# Patient Record
Sex: MALE | Race: WHITE | NOT HISPANIC OR LATINO | Employment: OTHER | URBAN - METROPOLITAN AREA
[De-identification: names, ages, dates, MRNs, and addresses within clinical notes are randomized per-mention and may not be internally consistent; named-entity substitution may affect disease eponyms.]

---

## 2019-12-05 ENCOUNTER — APPOINTMENT (OUTPATIENT)
Dept: GENERAL RADIOLOGY | Facility: HOSPITAL | Age: 64
End: 2019-12-05

## 2019-12-05 ENCOUNTER — HOSPITAL ENCOUNTER (OUTPATIENT)
Facility: HOSPITAL | Age: 64
Discharge: HOME | End: 2019-12-06
Attending: EMERGENCY MEDICINE | Admitting: INTERNAL MEDICINE

## 2019-12-05 DIAGNOSIS — T18.128A FOOD IMPACTION OF ESOPHAGUS, INITIAL ENCOUNTER: ICD-10-CM

## 2019-12-05 DIAGNOSIS — R13.10 DYSPHAGIA, UNSPECIFIED TYPE: Primary | ICD-10-CM

## 2019-12-05 LAB
ANION GAP SERPL CALCULATED.3IONS-SCNC: 16 MMOL/L (ref 5–15)
BASOPHILS # BLD AUTO: 0.05 10*3/MM3 (ref 0–0.2)
BASOPHILS NFR BLD AUTO: 0.6 % (ref 0–1.5)
BUN BLD-MCNC: 17 MG/DL (ref 8–23)
BUN/CREAT SERPL: 15.6 (ref 7–25)
CALCIUM SPEC-SCNC: 9.6 MG/DL (ref 8.6–10.5)
CHLORIDE SERPL-SCNC: 102 MMOL/L (ref 98–107)
CO2 SERPL-SCNC: 24 MMOL/L (ref 22–29)
CREAT BLD-MCNC: 1.09 MG/DL (ref 0.76–1.27)
DEPRECATED RDW RBC AUTO: 45.9 FL (ref 37–54)
EOSINOPHIL # BLD AUTO: 0.29 10*3/MM3 (ref 0–0.4)
EOSINOPHIL NFR BLD AUTO: 3.4 % (ref 0.3–6.2)
ERYTHROCYTE [DISTWIDTH] IN BLOOD BY AUTOMATED COUNT: 13 % (ref 12.3–15.4)
GFR SERPL CREATININE-BSD FRML MDRD: 68 ML/MIN/1.73
GLUCOSE BLD-MCNC: 192 MG/DL (ref 65–99)
HCT VFR BLD AUTO: 42.4 % (ref 37.5–51)
HGB BLD-MCNC: 13.7 G/DL (ref 13–17.7)
HOLD SPECIMEN: NORMAL
HOLD SPECIMEN: NORMAL
IMM GRANULOCYTES # BLD AUTO: 0.03 10*3/MM3 (ref 0–0.05)
IMM GRANULOCYTES NFR BLD AUTO: 0.3 % (ref 0–0.5)
LYMPHOCYTES # BLD AUTO: 3.27 10*3/MM3 (ref 0.7–3.1)
LYMPHOCYTES NFR BLD AUTO: 38.1 % (ref 19.6–45.3)
MCH RBC QN AUTO: 31 PG (ref 26.6–33)
MCHC RBC AUTO-ENTMCNC: 32.3 G/DL (ref 31.5–35.7)
MCV RBC AUTO: 95.9 FL (ref 79–97)
MONOCYTES # BLD AUTO: 0.65 10*3/MM3 (ref 0.1–0.9)
MONOCYTES NFR BLD AUTO: 7.6 % (ref 5–12)
NEUTROPHILS # BLD AUTO: 4.3 10*3/MM3 (ref 1.7–7)
NEUTROPHILS NFR BLD AUTO: 50 % (ref 42.7–76)
NRBC BLD AUTO-RTO: 0 /100 WBC (ref 0–0.2)
PLATELET # BLD AUTO: 229 10*3/MM3 (ref 140–450)
PMV BLD AUTO: 11.1 FL (ref 6–12)
POTASSIUM BLD-SCNC: 4 MMOL/L (ref 3.5–5.2)
RBC # BLD AUTO: 4.42 10*6/MM3 (ref 4.14–5.8)
SODIUM BLD-SCNC: 142 MMOL/L (ref 136–145)
WBC NRBC COR # BLD: 8.59 10*3/MM3 (ref 3.4–10.8)
WHOLE BLOOD HOLD SPECIMEN: NORMAL
WHOLE BLOOD HOLD SPECIMEN: NORMAL

## 2019-12-05 PROCEDURE — 85025 COMPLETE CBC W/AUTO DIFF WBC: CPT | Performed by: EMERGENCY MEDICINE

## 2019-12-05 PROCEDURE — 83690 ASSAY OF LIPASE: CPT | Performed by: FAMILY MEDICINE

## 2019-12-05 PROCEDURE — 80048 BASIC METABOLIC PNL TOTAL CA: CPT | Performed by: EMERGENCY MEDICINE

## 2019-12-05 PROCEDURE — 86900 BLOOD TYPING SEROLOGIC ABO: CPT

## 2019-12-05 PROCEDURE — 71045 X-RAY EXAM CHEST 1 VIEW: CPT

## 2019-12-05 PROCEDURE — G0378 HOSPITAL OBSERVATION PER HR: HCPCS

## 2019-12-05 PROCEDURE — 86901 BLOOD TYPING SEROLOGIC RH(D): CPT

## 2019-12-05 PROCEDURE — 96375 TX/PRO/DX INJ NEW DRUG ADDON: CPT

## 2019-12-05 PROCEDURE — 99284 EMERGENCY DEPT VISIT MOD MDM: CPT

## 2019-12-05 PROCEDURE — 25010000002 GLUCAGON (HUMAN RECOMBINANT) 1 MG RECONSTITUTED SOLUTION: Performed by: EMERGENCY MEDICINE

## 2019-12-05 PROCEDURE — 25010000002 ONDANSETRON PER 1 MG: Performed by: EMERGENCY MEDICINE

## 2019-12-05 PROCEDURE — 96374 THER/PROPH/DIAG INJ IV PUSH: CPT

## 2019-12-05 RX ORDER — ONDANSETRON 2 MG/ML
4 INJECTION INTRAMUSCULAR; INTRAVENOUS ONCE
Status: COMPLETED | OUTPATIENT
Start: 2019-12-05 | End: 2019-12-05

## 2019-12-05 RX ORDER — WATER 1000 ML/1000ML
INJECTION, SOLUTION INTRAVENOUS
Status: DISPENSED
Start: 2019-12-05 | End: 2019-12-06

## 2019-12-05 RX ADMIN — ONDANSETRON 4 MG: 2 INJECTION INTRAMUSCULAR; INTRAVENOUS at 23:10

## 2019-12-05 RX ADMIN — SODIUM CHLORIDE 500 ML: 9 INJECTION, SOLUTION INTRAVENOUS at 23:50

## 2019-12-05 RX ADMIN — GLUCAGON HYDROCHLORIDE 1 MG: 1 INJECTION, POWDER, FOR SOLUTION INTRAMUSCULAR; INTRAVENOUS; SUBCUTANEOUS at 23:12

## 2019-12-06 ENCOUNTER — ANESTHESIA EVENT (OUTPATIENT)
Dept: GASTROENTEROLOGY | Facility: HOSPITAL | Age: 64
End: 2019-12-06

## 2019-12-06 ENCOUNTER — APPOINTMENT (OUTPATIENT)
Dept: CT IMAGING | Facility: HOSPITAL | Age: 64
End: 2019-12-06

## 2019-12-06 ENCOUNTER — ANESTHESIA (OUTPATIENT)
Dept: GASTROENTEROLOGY | Facility: HOSPITAL | Age: 64
End: 2019-12-06

## 2019-12-06 VITALS
TEMPERATURE: 98.8 F | SYSTOLIC BLOOD PRESSURE: 127 MMHG | WEIGHT: 185 LBS | RESPIRATION RATE: 16 BRPM | BODY MASS INDEX: 25.9 KG/M2 | HEIGHT: 71 IN | HEART RATE: 88 BPM | DIASTOLIC BLOOD PRESSURE: 82 MMHG | OXYGEN SATURATION: 93 %

## 2019-12-06 PROBLEM — G20 PARKINSON DISEASE (HCC): Status: ACTIVE | Noted: 2019-12-06

## 2019-12-06 PROBLEM — H54.8 LEGALLY BLIND: Status: ACTIVE | Noted: 2019-12-06

## 2019-12-06 PROBLEM — E11.9 T2DM (TYPE 2 DIABETES MELLITUS) (HCC): Status: ACTIVE | Noted: 2019-12-06

## 2019-12-06 PROBLEM — R13.10 DYSPHAGIA: Status: ACTIVE | Noted: 2019-12-06

## 2019-12-06 PROBLEM — R55 VASO VAGAL EPISODE: Status: ACTIVE | Noted: 2019-12-06

## 2019-12-06 PROBLEM — K21.9 GERD (GASTROESOPHAGEAL REFLUX DISEASE): Status: ACTIVE | Noted: 2019-12-06

## 2019-12-06 PROBLEM — K22.70 BARRETT'S ESOPHAGUS: Status: ACTIVE | Noted: 2019-12-06

## 2019-12-06 PROBLEM — J45.909 ASTHMA: Status: ACTIVE | Noted: 2019-12-06

## 2019-12-06 PROBLEM — K20.90 ESOPHAGITIS: Status: ACTIVE | Noted: 2019-12-05

## 2019-12-06 PROBLEM — I10 HTN (HYPERTENSION): Status: ACTIVE | Noted: 2019-12-06

## 2019-12-06 PROBLEM — H35.52 RETINITIS PIGMENTOSA: Status: ACTIVE | Noted: 2019-12-06

## 2019-12-06 LAB
ABO GROUP BLD: NORMAL
ABO GROUP BLD: NORMAL
ALBUMIN SERPL-MCNC: 4.3 G/DL (ref 3.5–5.2)
ALBUMIN/GLOB SERPL: 1.6 G/DL
ALP SERPL-CCNC: 42 U/L (ref 39–117)
ALT SERPL W P-5'-P-CCNC: 20 U/L (ref 1–41)
ANION GAP SERPL CALCULATED.3IONS-SCNC: 14 MMOL/L (ref 5–15)
APTT PPP: 30.3 SECONDS (ref 24–37)
AST SERPL-CCNC: 13 U/L (ref 1–40)
BILIRUB SERPL-MCNC: 0.3 MG/DL (ref 0.2–1.2)
BLD GP AB SCN SERPL QL: NEGATIVE
BUN BLD-MCNC: 16 MG/DL (ref 8–23)
BUN/CREAT SERPL: 15.4 (ref 7–25)
CALCIUM SPEC-SCNC: 9.1 MG/DL (ref 8.6–10.5)
CHLORIDE SERPL-SCNC: 101 MMOL/L (ref 98–107)
CO2 SERPL-SCNC: 25 MMOL/L (ref 22–29)
CREAT BLD-MCNC: 1.04 MG/DL (ref 0.76–1.27)
DIGOXIN SERPL-MCNC: 0.68 NG/ML (ref 0.6–1.2)
GFR SERPL CREATININE-BSD FRML MDRD: 72 ML/MIN/1.73
GLOBULIN UR ELPH-MCNC: 2.7 GM/DL
GLUCOSE BLD-MCNC: 149 MG/DL (ref 65–99)
GLUCOSE BLDC GLUCOMTR-MCNC: 143 MG/DL (ref 70–130)
GLUCOSE BLDC GLUCOMTR-MCNC: 156 MG/DL (ref 70–130)
INR PPP: 1.02 (ref 0.85–1.16)
LIPASE SERPL-CCNC: 41 U/L (ref 13–60)
POTASSIUM BLD-SCNC: 4.5 MMOL/L (ref 3.5–5.2)
PROT SERPL-MCNC: 7 G/DL (ref 6–8.5)
PROTHROMBIN TIME: 12.9 SECONDS (ref 11.2–14.3)
RH BLD: NEGATIVE
RH BLD: NEGATIVE
SODIUM BLD-SCNC: 140 MMOL/L (ref 136–145)
T&S EXPIRATION DATE: NORMAL

## 2019-12-06 PROCEDURE — 63710000001 INSULIN LISPRO (HUMAN) PER 5 UNITS: Performed by: NURSE PRACTITIONER

## 2019-12-06 PROCEDURE — 93010 ELECTROCARDIOGRAM REPORT: CPT | Performed by: INTERNAL MEDICINE

## 2019-12-06 PROCEDURE — 74176 CT ABD & PELVIS W/O CONTRAST: CPT

## 2019-12-06 PROCEDURE — G0378 HOSPITAL OBSERVATION PER HR: HCPCS

## 2019-12-06 PROCEDURE — 85610 PROTHROMBIN TIME: CPT | Performed by: NURSE PRACTITIONER

## 2019-12-06 PROCEDURE — 99236 HOSP IP/OBS SAME DATE HI 85: CPT | Performed by: FAMILY MEDICINE

## 2019-12-06 PROCEDURE — 80162 ASSAY OF DIGOXIN TOTAL: CPT | Performed by: INTERNAL MEDICINE

## 2019-12-06 PROCEDURE — 82962 GLUCOSE BLOOD TEST: CPT

## 2019-12-06 PROCEDURE — 88305 TISSUE EXAM BY PATHOLOGIST: CPT | Performed by: INTERNAL MEDICINE

## 2019-12-06 PROCEDURE — 25010000002 ACYCLOVIR PER 5 MG: Performed by: INTERNAL MEDICINE

## 2019-12-06 PROCEDURE — 25010000002 SUCCINYLCHOLINE PER 20 MG: Performed by: NURSE ANESTHETIST, CERTIFIED REGISTERED

## 2019-12-06 PROCEDURE — 86850 RBC ANTIBODY SCREEN: CPT | Performed by: NURSE PRACTITIONER

## 2019-12-06 PROCEDURE — 99204 OFFICE O/P NEW MOD 45 MIN: CPT | Performed by: INTERNAL MEDICINE

## 2019-12-06 PROCEDURE — 93005 ELECTROCARDIOGRAM TRACING: CPT | Performed by: FAMILY MEDICINE

## 2019-12-06 PROCEDURE — 80053 COMPREHEN METABOLIC PANEL: CPT | Performed by: FAMILY MEDICINE

## 2019-12-06 PROCEDURE — 25010000002 PROPOFOL 10 MG/ML EMULSION: Performed by: NURSE ANESTHETIST, CERTIFIED REGISTERED

## 2019-12-06 PROCEDURE — 85730 THROMBOPLASTIN TIME PARTIAL: CPT | Performed by: NURSE PRACTITIONER

## 2019-12-06 PROCEDURE — 71250 CT THORAX DX C-: CPT

## 2019-12-06 PROCEDURE — 96361 HYDRATE IV INFUSION ADD-ON: CPT

## 2019-12-06 PROCEDURE — 86901 BLOOD TYPING SEROLOGIC RH(D): CPT | Performed by: NURSE PRACTITIONER

## 2019-12-06 PROCEDURE — 25010000002 MORPHINE PER 10 MG: Performed by: FAMILY MEDICINE

## 2019-12-06 PROCEDURE — 96376 TX/PRO/DX INJ SAME DRUG ADON: CPT

## 2019-12-06 PROCEDURE — 86900 BLOOD TYPING SEROLOGIC ABO: CPT | Performed by: NURSE PRACTITIONER

## 2019-12-06 PROCEDURE — 96375 TX/PRO/DX INJ NEW DRUG ADDON: CPT

## 2019-12-06 RX ORDER — AMOXICILLIN 500 MG
CAPSULE ORAL 2 TIMES DAILY WITH MEALS
COMMUNITY

## 2019-12-06 RX ORDER — ALBUTEROL SULFATE 90 UG/1
2 AEROSOL, METERED RESPIRATORY (INHALATION) EVERY 4 HOURS PRN
COMMUNITY

## 2019-12-06 RX ORDER — ROCURONIUM BROMIDE 10 MG/ML
INJECTION, SOLUTION INTRAVENOUS AS NEEDED
Status: DISCONTINUED | OUTPATIENT
Start: 2019-12-06 | End: 2019-12-06 | Stop reason: SURG

## 2019-12-06 RX ORDER — CLONAZEPAM 1 MG/1
1 TABLET ORAL 2 TIMES DAILY PRN
COMMUNITY

## 2019-12-06 RX ORDER — ONDANSETRON 2 MG/ML
4 INJECTION INTRAMUSCULAR; INTRAVENOUS EVERY 6 HOURS PRN
Status: DISCONTINUED | OUTPATIENT
Start: 2019-12-06 | End: 2019-12-06 | Stop reason: SDUPTHER

## 2019-12-06 RX ORDER — PANTOPRAZOLE SODIUM 40 MG/10ML
40 INJECTION, POWDER, LYOPHILIZED, FOR SOLUTION INTRAVENOUS EVERY 12 HOURS SCHEDULED
Status: DISCONTINUED | OUTPATIENT
Start: 2019-12-06 | End: 2019-12-06 | Stop reason: HOSPADM

## 2019-12-06 RX ORDER — GABAPENTIN 800 MG/1
800 TABLET ORAL 2 TIMES DAILY
COMMUNITY

## 2019-12-06 RX ORDER — PROMETHAZINE HYDROCHLORIDE 25 MG/ML
6.25 INJECTION, SOLUTION INTRAMUSCULAR; INTRAVENOUS ONCE AS NEEDED
Status: DISCONTINUED | OUTPATIENT
Start: 2019-12-06 | End: 2019-12-06 | Stop reason: HOSPADM

## 2019-12-06 RX ORDER — LORAZEPAM 2 MG/ML
0.5 INJECTION INTRAMUSCULAR ONCE
Status: DISCONTINUED | OUTPATIENT
Start: 2019-12-06 | End: 2019-12-06

## 2019-12-06 RX ORDER — ACYCLOVIR 400 MG/1
400 TABLET ORAL 3 TIMES DAILY
Qty: 30 TABLET | Refills: 0 | Status: SHIPPED | OUTPATIENT
Start: 2019-12-06 | End: 2019-12-16

## 2019-12-06 RX ORDER — ALBUTEROL SULFATE 2.5 MG/3ML
2.5 SOLUTION RESPIRATORY (INHALATION) EVERY 6 HOURS PRN
Status: DISCONTINUED | OUTPATIENT
Start: 2019-12-06 | End: 2019-12-06 | Stop reason: HOSPADM

## 2019-12-06 RX ORDER — PANTOPRAZOLE SODIUM 40 MG/1
40 TABLET, DELAYED RELEASE ORAL DAILY
Qty: 30 TABLET | Refills: 0 | Status: SHIPPED | OUTPATIENT
Start: 2019-12-06

## 2019-12-06 RX ORDER — FAMOTIDINE 10 MG/ML
20 INJECTION, SOLUTION INTRAVENOUS EVERY 12 HOURS SCHEDULED
Status: DISCONTINUED | OUTPATIENT
Start: 2019-12-06 | End: 2019-12-06 | Stop reason: HOSPADM

## 2019-12-06 RX ORDER — DEXTROSE MONOHYDRATE 25 G/50ML
25 INJECTION, SOLUTION INTRAVENOUS
Status: DISCONTINUED | OUTPATIENT
Start: 2019-12-06 | End: 2019-12-06 | Stop reason: HOSPADM

## 2019-12-06 RX ORDER — DIGOXIN 125 MCG
125 TABLET ORAL
COMMUNITY
End: 2019-12-06 | Stop reason: HOSPADM

## 2019-12-06 RX ORDER — SUCCINYLCHOLINE CHLORIDE 20 MG/ML
INJECTION INTRAMUSCULAR; INTRAVENOUS AS NEEDED
Status: DISCONTINUED | OUTPATIENT
Start: 2019-12-06 | End: 2019-12-06 | Stop reason: SURG

## 2019-12-06 RX ORDER — SODIUM CHLORIDE 0.9 % (FLUSH) 0.9 %
10 SYRINGE (ML) INJECTION EVERY 12 HOURS SCHEDULED
Status: DISCONTINUED | OUTPATIENT
Start: 2019-12-06 | End: 2019-12-06 | Stop reason: HOSPADM

## 2019-12-06 RX ORDER — ASPIRIN 325 MG
325 TABLET ORAL 2 TIMES DAILY
COMMUNITY

## 2019-12-06 RX ORDER — ONDANSETRON 2 MG/ML
4 INJECTION INTRAMUSCULAR; INTRAVENOUS ONCE AS NEEDED
Status: DISCONTINUED | OUTPATIENT
Start: 2019-12-06 | End: 2019-12-06 | Stop reason: HOSPADM

## 2019-12-06 RX ORDER — ONDANSETRON 4 MG/1
4 TABLET, FILM COATED ORAL EVERY 6 HOURS PRN
Status: DISCONTINUED | OUTPATIENT
Start: 2019-12-06 | End: 2019-12-06 | Stop reason: HOSPADM

## 2019-12-06 RX ORDER — SODIUM CHLORIDE 0.9 % (FLUSH) 0.9 %
10 SYRINGE (ML) INJECTION AS NEEDED
Status: DISCONTINUED | OUTPATIENT
Start: 2019-12-06 | End: 2019-12-06 | Stop reason: HOSPADM

## 2019-12-06 RX ORDER — MORPHINE SULFATE 2 MG/ML
2 INJECTION, SOLUTION INTRAMUSCULAR; INTRAVENOUS
Status: DISCONTINUED | OUTPATIENT
Start: 2019-12-06 | End: 2019-12-06 | Stop reason: HOSPADM

## 2019-12-06 RX ORDER — PROMETHAZINE HYDROCHLORIDE 25 MG/1
25 SUPPOSITORY RECTAL ONCE AS NEEDED
Status: DISCONTINUED | OUTPATIENT
Start: 2019-12-06 | End: 2019-12-06 | Stop reason: HOSPADM

## 2019-12-06 RX ORDER — PROPOFOL 10 MG/ML
VIAL (ML) INTRAVENOUS AS NEEDED
Status: DISCONTINUED | OUTPATIENT
Start: 2019-12-06 | End: 2019-12-06 | Stop reason: SURG

## 2019-12-06 RX ORDER — SODIUM CHLORIDE, SODIUM LACTATE, POTASSIUM CHLORIDE, CALCIUM CHLORIDE 600; 310; 30; 20 MG/100ML; MG/100ML; MG/100ML; MG/100ML
20 INJECTION, SOLUTION INTRAVENOUS ONCE
Status: COMPLETED | OUTPATIENT
Start: 2019-12-06 | End: 2019-12-06

## 2019-12-06 RX ORDER — NORTRIPTYLINE HYDROCHLORIDE 25 MG/1
25 CAPSULE ORAL NIGHTLY
COMMUNITY

## 2019-12-06 RX ORDER — HYDROCODONE BITARTRATE AND ACETAMINOPHEN 5; 325 MG/1; MG/1
1 TABLET ORAL EVERY 6 HOURS PRN
COMMUNITY

## 2019-12-06 RX ORDER — IPRATROPIUM BROMIDE AND ALBUTEROL SULFATE 2.5; .5 MG/3ML; MG/3ML
3 SOLUTION RESPIRATORY (INHALATION) ONCE AS NEEDED
Status: DISCONTINUED | OUTPATIENT
Start: 2019-12-06 | End: 2019-12-06 | Stop reason: HOSPADM

## 2019-12-06 RX ORDER — MORPHINE SULFATE 2 MG/ML
2 INJECTION, SOLUTION INTRAMUSCULAR; INTRAVENOUS ONCE
Status: COMPLETED | OUTPATIENT
Start: 2019-12-06 | End: 2019-12-06

## 2019-12-06 RX ORDER — PROMETHAZINE HYDROCHLORIDE 25 MG/1
25 TABLET ORAL ONCE AS NEEDED
Status: DISCONTINUED | OUTPATIENT
Start: 2019-12-06 | End: 2019-12-06 | Stop reason: HOSPADM

## 2019-12-06 RX ORDER — LORAZEPAM 2 MG/ML
0.25 INJECTION INTRAMUSCULAR ONCE
Status: DISCONTINUED | OUTPATIENT
Start: 2019-12-06 | End: 2019-12-06

## 2019-12-06 RX ORDER — SODIUM CHLORIDE 9 MG/ML
75 INJECTION, SOLUTION INTRAVENOUS ONCE
Status: DISCONTINUED | OUTPATIENT
Start: 2019-12-06 | End: 2019-12-06 | Stop reason: HOSPADM

## 2019-12-06 RX ORDER — ONDANSETRON 2 MG/ML
4 INJECTION INTRAMUSCULAR; INTRAVENOUS EVERY 6 HOURS PRN
Status: DISCONTINUED | OUTPATIENT
Start: 2019-12-06 | End: 2019-12-06 | Stop reason: HOSPADM

## 2019-12-06 RX ORDER — LABETALOL HYDROCHLORIDE 5 MG/ML
5 INJECTION, SOLUTION INTRAVENOUS
Status: DISCONTINUED | OUTPATIENT
Start: 2019-12-06 | End: 2019-12-06 | Stop reason: HOSPADM

## 2019-12-06 RX ORDER — SODIUM CHLORIDE, SODIUM LACTATE, POTASSIUM CHLORIDE, CALCIUM CHLORIDE 600; 310; 30; 20 MG/100ML; MG/100ML; MG/100ML; MG/100ML
100 INJECTION, SOLUTION INTRAVENOUS CONTINUOUS
Status: DISCONTINUED | OUTPATIENT
Start: 2019-12-06 | End: 2019-12-06 | Stop reason: HOSPADM

## 2019-12-06 RX ORDER — ROSUVASTATIN CALCIUM 5 MG/1
5 TABLET, COATED ORAL DAILY
COMMUNITY

## 2019-12-06 RX ORDER — HYDRALAZINE HYDROCHLORIDE 20 MG/ML
5 INJECTION INTRAMUSCULAR; INTRAVENOUS
Status: DISCONTINUED | OUTPATIENT
Start: 2019-12-06 | End: 2019-12-06 | Stop reason: HOSPADM

## 2019-12-06 RX ORDER — NICOTINE POLACRILEX 4 MG
15 LOZENGE BUCCAL
Status: DISCONTINUED | OUTPATIENT
Start: 2019-12-06 | End: 2019-12-06 | Stop reason: HOSPADM

## 2019-12-06 RX ADMIN — FAMOTIDINE 20 MG: 10 INJECTION, SOLUTION INTRAVENOUS at 08:34

## 2019-12-06 RX ADMIN — MORPHINE SULFATE 2 MG: 2 INJECTION, SOLUTION INTRAMUSCULAR; INTRAVENOUS at 05:00

## 2019-12-06 RX ADMIN — PANTOPRAZOLE SODIUM 40 MG: 40 INJECTION, POWDER, FOR SOLUTION INTRAVENOUS at 03:11

## 2019-12-06 RX ADMIN — SODIUM CHLORIDE, POTASSIUM CHLORIDE, SODIUM LACTATE AND CALCIUM CHLORIDE 100 ML/HR: 600; 310; 30; 20 INJECTION, SOLUTION INTRAVENOUS at 03:12

## 2019-12-06 RX ADMIN — SODIUM CHLORIDE, PRESERVATIVE FREE 10 ML: 5 INJECTION INTRAVENOUS at 08:34

## 2019-12-06 RX ADMIN — ACYCLOVIR SODIUM 750 MG: 50 INJECTION, SOLUTION INTRAVENOUS at 15:05

## 2019-12-06 RX ADMIN — FAMOTIDINE 20 MG: 10 INJECTION, SOLUTION INTRAVENOUS at 03:11

## 2019-12-06 RX ADMIN — SUCCINYLCHOLINE CHLORIDE 180 MG: 20 INJECTION, SOLUTION INTRAMUSCULAR; INTRAVENOUS; PARENTERAL at 11:23

## 2019-12-06 RX ADMIN — SODIUM CHLORIDE, POTASSIUM CHLORIDE, SODIUM LACTATE AND CALCIUM CHLORIDE 20 ML/HR: 600; 310; 30; 20 INJECTION, SOLUTION INTRAVENOUS at 11:04

## 2019-12-06 RX ADMIN — PROPOFOL 100 MG: 10 INJECTION, EMULSION INTRAVENOUS at 11:23

## 2019-12-06 RX ADMIN — ROCURONIUM BROMIDE 5 MG: 10 INJECTION INTRAVENOUS at 11:23

## 2019-12-06 RX ADMIN — PANTOPRAZOLE SODIUM 40 MG: 40 INJECTION, POWDER, FOR SOLUTION INTRAVENOUS at 08:34

## 2019-12-06 NOTE — ANESTHESIA PROCEDURE NOTES
Airway  Urgency: elective    Date/Time: 12/6/2019 11:24 AM  Airway not difficult    General Information and Staff    Patient location during procedure: OR  CRNA: Clifton Mast CRNA    Indications and Patient Condition  Indications for airway management: airway protection    Preoxygenated: yes  MILS not maintained throughout  Mask difficulty assessment: 1 - vent by mask    Final Airway Details  Final airway type: endotracheal airway      Successful airway: ETT  Cuffed: yes   Successful intubation technique: direct laryngoscopy and RSI  Facilitating devices/methods: cricoid pressure  Endotracheal tube insertion site: oral  Blade: Roberot  Blade size: 3  ETT size (mm): 7.5  Cormack-Lehane Classification: grade I - full view of glottis  Placement verified by: chest auscultation and capnometry   Measured from: lips  ETT/EBT  to lips (cm): 20  Number of attempts at approach: 1    Additional Comments  Negative epigastric sounds, Breath sound equal bilaterally with symmetric chest rise and fall

## 2019-12-06 NOTE — ANESTHESIA POSTPROCEDURE EVALUATION
Patient: Hernan Caicedo    Procedure Summary     Date:  12/06/19 Room / Location:   [facility]    Anesthesia Start:  1113 Anesthesia Stop:  1145    Procedure:  ESOPHAGOGASTRODUODENOSCOPYwith biopsy (N/A ) Diagnosis:       Food impaction of esophagus, initial encounter      (Food impaction of esophagus, initial encounter [T18.128A])    Surgeon:  Aldo Mejia MD Provider:  Jose Francisco Cohen MD    Anesthesia Type:  general ASA Status:  3          Anesthesia Type: general  Last vitals  BP   127/82 (12/06/19 1220)   Temp   98.8 °F (37.1 °C) (12/06/19 1220)   Pulse   88 (12/06/19 1220)   Resp   16 (12/06/19 1220)     SpO2   93 % (12/06/19 1220)     Post Anesthesia Care and Evaluation    Patient location during evaluation: PACU  Patient participation: complete - patient participated  Level of consciousness: awake and alert  Pain score: 0  Pain management: adequate  Airway patency: patent  Anesthetic complications: No anesthetic complications  PONV Status: none  Cardiovascular status: hemodynamically stable and acceptable  Respiratory status: nonlabored ventilation, acceptable and nasal cannula  Hydration status: acceptable

## 2019-12-06 NOTE — CONSULTS
[facility]    Referring Provider: No ref. provider found    Primary Care Provider: Provider, No Known    Reason for Consultation: Esophageal food impaction    Chief complaint cannot swallow    History of present illness:  Hernan Caicedo is a 64 y.o. male who is admitted with esophageal food impaction.  This began about 7:00 last night.  States he has trouble with his secretions well.  Does complain of dyspepsia.  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  He is visiting here from [place] as his wife is at  undergoing evaluation for liver transplant.  He has had increased dysphasia dysphagia over just the last week.  No weight loss.  He takes antacids but no PPIs.  No prior meat impaction.  Overnight has had significant bradycardia.  He has had a lot of hiccups overnight.  Allergies:  Haldol [haloperidol]    Scheduled Meds:    Pharmacy Consult  Does not apply Q12H   famotidine 20 mg Intravenous Q12H   insulin lispro 0-7 Units Subcutaneous 4x Daily With Meals & Nightly   pantoprazole 40 mg Intravenous Q12H   sodium chloride 10 mL Intravenous Q12H   sodium chloride 75 mL/hr Intravenous Once   sterile water (preservative free)           Infusions:    lactated ringers 100 mL/hr Last Rate: 100 mL/hr (12/06/19 0312)       PRN Meds:  albuterol  •  dextrose  •  dextrose  •  glucagon (human recombinant)  •  Morphine  •  ondansetron **OR** ondansetron  •  ondansetron  •  sodium chloride    Home Meds:  Medications Prior to Admission   Medication Sig Dispense Refill Last Dose   • albuterol sulfate  (90 Base) MCG/ACT inhaler Inhale 2 puffs Every 4 (Four) Hours As Needed for Wheezing.   Past Week at Unknown time   • aspirin 325 MG tablet Take 325 mg by mouth 2 (Two) Times a Day.   12/6/2019 at Unknown time   • carbidopa-levodopa (SINEMET)  MG per tablet Take 2 tablets by mouth 4 (Four) Times a Day.   12/6/2019 at 1200   • clonazePAM (KlonoPIN) 1 MG tablet Take 1 mg by mouth 2 (Two)  Times a Day As Needed for Seizures.   12/5/2019 at Unknown time   • digoxin (LANOXIN) 125 MCG tablet Take 125 mcg by mouth Daily.   12/6/2019 at Unknown time   • gabapentin (NEURONTIN) 800 MG tablet Take 800 mg by mouth 2 (Two) Times a Day.   12/6/2019 at 0900   • HYDROcodone-acetaminophen (NORCO) 5-325 MG per tablet Take 1 tablet by mouth Every 6 (Six) Hours As Needed.   Past Week at Unknown time   • metFORMIN (GLUCOPHAGE) 500 MG tablet Take 500 mg by mouth 2 (Two) Times a Day With Meals.   12/6/2019 at Unknown time   • mometasone (ASMANEX TWISTHALER) inhaler 110 mcg/inhalation Inhale 2 puffs Daily.   12/6/2019 at Unknown time   • nortriptyline (PAMELOR) 25 MG capsule Take 25 mg by mouth Every Night.   12/5/2019 at Unknown time   • Omega-3 Fatty Acids (FISH OIL) 1200 MG capsule capsule Take  by mouth 2 (Two) Times a Day With Meals.   12/6/2019 at Unknown time   • rosuvastatin (CRESTOR) 5 MG tablet Take 5 mg by mouth Daily.   12/6/2019 at Unknown time   • vitamin A 34882 UNIT capsule Take 16,000 Units by mouth Daily.   12/6/2019 at Unknown time       ROS: Review of Systems   Constitutional: Negative for appetite change and unexpected weight change.   HENT: Positive for trouble swallowing.    Eyes: Positive for visual disturbance.   Respiratory: Negative for shortness of breath.    Cardiovascular: Negative for chest pain.   Gastrointestinal: Negative for abdominal pain.   Skin: Negative for color change.   All other systems reviewed and are negative.      PAST MED HX: Pt  has a past medical history of Asthma, Diabetes mellitus (CMS/HCC), GERD (gastroesophageal reflux disease), vasectomy, Hypertension, Legally blind, Orthostatic hypotension, Parkinson disease (CMS/HCC), Parkinson disease (CMS/HCC), Parkinson disease (CMS/HCC), PONV (postoperative nausea and vomiting), and Retinitis pigmentosa.  PAST SURG HX: Pt  has a past surgical history that includes Appendectomy; Cataract extraction, bilateral (2000);  "Tonsillectomy; Cholecystectomy; and Esophagogastroduodenoscopy.  FAM HX: family history includes Cancer in his mother; Neuropathy in his father.  SOC HX: Pt  reports that he has never smoked. He has never used smokeless tobacco. He reports that he does not drink alcohol or use drugs.    /94 (BP Location: Right arm, Patient Position: Lying)   Pulse 76   Temp 97.9 °F (36.6 °C) (Oral)   Resp 16   Ht 180.3 cm (71\")   Wt 83.9 kg (185 lb)   SpO2 94%   BMI 25.80 kg/m²     Physical Exam  Wt Readings from Last 3 Encounters:   12/05/19 83.9 kg (185 lb)   ,body mass index is 25.8 kg/m².    General Well developed; well nourished; no acute distress.  He is not drooling.  He does have hiccups  ENT Good dentition.  Oral mucosa pink & moist without thrush or lesions.    Neck Neck supple; trachea midline. No thyromegaly  Resp CTA; no rhonchi, rales, or wheezes.  Respiration effort normal  CV RRR; ; no M/R/G. No lower extremity edema  GI Abd soft, NT, ND, normal active bowel sounds.  No HSM.  No abd hernia  Skin No rash; no lesions; no bruises.  Skin turgor normal  Musc No clubbing; no cyanosis.    Psych Oriented to time, place, and person.  Appropriate affect      Results Review:   I reviewed the patient's new clinical results.  I reviewed the patient's new imaging results and agree with the interpretation.    Lab Results   Component Value Date    WBC 8.59 12/05/2019    HGB 13.7 12/05/2019    HCT 42.4 12/05/2019    MCV 95.9 12/05/2019     12/05/2019       Lab Results   Component Value Date    GLUCOSE 149 (H) 12/06/2019    BUN 16 12/06/2019    CREATININE 1.04 12/06/2019    EGFRIFNONA 72 12/06/2019    BCR 15.4 12/06/2019    CO2 25.0 12/06/2019    CALCIUM 9.1 12/06/2019    ALBUMIN 4.30 12/06/2019    AST 13 12/06/2019    ALT 20 12/06/2019       ASSESSMENTS/PLANS    1.  Esophageal food impaction  2.  Bradycardia  3.  Legally blind secondary to retinitis pigmentosa  4.  Parkinson's disease    Currently patient seems to " be tolerating secretions well.  He is having a lot of hiccups.  This started last night as well.  Does not appear to be in distress.  He has had bradycardia during the evening with heart rates down into the 30s.  No prior cardiac history.    Continue PPI.  Plan EGD this a.m. if cleared by cardiology.    I discussed the patients findings and my recommendations with patient and primary care team    Aldo Mejia MD  12/06/19  8:38 AM
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
## 2019-12-06 NOTE — DISCHARGE SUMMARY
[facility]  DISCHARGE SUMMARY    Patient Name: Hernan Caicedo  : 1955  MRN: [id number]    Date of Admission: 2019  9:31 PM  Date of Discharge:  2019  Primary Care Physician: Provider, No Known    Consults     Date and Time Order Name Status Description    2019 0239 Inpatient Cardiology Consult Completed     2019 0208 Inpatient Gastroenterology Consult Completed           Hospital Course     Presenting Problem:   Food impaction of esophagus [T18.128A]  Dysphagia, unspecified type [R13.10]    Active Hospital Problems    Diagnosis  POA   • **Esophagitis [K20.9]  Yes   • T2DM (type 2 diabetes mellitus) (CMS/[facility]) [E11.9]  Yes   • GERD (gastroesophageal reflux disease) [K21.9]  Yes   • HTN (hypertension) [I10]  Yes   • Parkinson disease (CMS/[facility]) [G20]  Yes   • Retinitis pigmentosa [H35.52]  Yes   • Legally blind [H54.8]  Yes   • Asthma [J45.909]  Yes   • Vaso vagal episode [R55]  No   • Dysphagia [R13.10]  Yes   • 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          Hospital Course:  Hernan Caicedo is a 64 y.o. male with HTN, DM2, retinitis pigmentosa, Parkinson's disease and asthma who presented to the ED with difficulty swallowing and sensation of being choked on french fries followed by terrible heart burn.  CT scan in the ED was concerning for possible food impaction.  GI evaluated for EGD which was performed and showed LA grade C esophagitis with ulceration, possibly viral as well as Washington's from 30-40cm.  He was started on acyclovir and PPI per GI recommendation.  Biopsies are pending.  He will need to follow up with his PCP and likely be referred to gastroenterology for surveillance EGD in the future depending on overall health and goals of care.    He had some episodes of bradycardia after admission while awaiting EGD.  Cardiology evaluated and felt these were likely vagal due to  esophageal process.  He was noted to be on digoxin for orthostatic hypotension per his report.  Cardiology felt there was no clear indication for digoxin and this should be stopped at discharge.    He was traveling from [place] [facility] and requested to be discharged so he could travel back with his family.  He was able to tolerate a meal without difficulty prior to discharge and felt safe for transfer.      Discharge Follow Up Recommendations for labs/diagnostics:  Follow up with PCP within 1 week  Consider referral to GI for surveillance endoscopy    Day of Discharge     HPI: Feeling much better and wishes to be discharged home.    Review of Systems  Gen- No fevers, chills  CV- No chest pain, palpitations  Resp- No cough, dyspnea  GI- No N/V/D, abd pain    Otherwise ROS is negative except as mentioned in the HPI.    Vital Signs:   Temp:  [97.2 °F (36.2 °C)-98.8 °F (37.1 °C)] 98.8 °F (37.1 °C)  Heart Rate:  [52-97] 88  Resp:  [12-20] 16  BP: (125-189)/() 127/82     Physical Exam:  Constitutional: No acute distress, awake, alert, sitting up in bed  HENT: NCAT, mucous membranes moist  Respiratory: Clear to auscultation bilaterally, respiratory effort normal   Cardiovascular: RRR, no murmurs, rubs, or gallops  Gastrointestinal: Positive bowel sounds, soft, nontender, nondistended  Musculoskeletal: No bilateral ankle edema  Psychiatric: Appropriate affect, cooperative  Neurologic: Visual impairment, speech clear  Skin: No rashes    Pertinent  and/or Most Recent Results     Results from last 7 days   Lab Units 12/06/19  0336 12/05/19  2143   WBC 10*3/mm3  --  8.59   HEMOGLOBIN g/dL  --  13.7   HEMATOCRIT %  --  42.4   PLATELETS 10*3/mm3  --  229   SODIUM mmol/L 140 142   POTASSIUM mmol/L 4.5 4.0   CHLORIDE mmol/L 101 102   CO2 mmol/L 25.0 24.0   BUN mg/dL 16 17   CREATININE mg/dL 1.04 1.09   GLUCOSE mg/dL 149* 192*   CALCIUM mg/dL 9.1 9.6     Results from last 7 days   Lab Units 12/06/19  0336   BILIRUBIN mg/dL 0.3    ALK PHOS U/L 42   ALT (SGPT) U/L 20   AST (SGOT) U/L 13   PROTIME Seconds 12.9   INR  1.02   APTT seconds 30.3           Invalid input(s): TG, LDLCALC, LDLREALC        Brief Urine Lab Results     None          Microbiology Results Abnormal     None          Imaging Results (All)     Procedure Component Value Units Date/Time    CT Chest Without Contrast [[id number]] Collected:  12/06/19 0614     Updated:  12/06/19 0616    Narrative:       CT Chest WO    INDICATION:   64-year-old male with dysphasia and esophageal obstruction after eating a [place] magdaleno yesterday.    TECHNIQUE:   CT of the thorax without IV contrast. Coronal and sagittal reconstructions were obtained.  Radiation dose reduction techniques included automated exposure control or exposure modulation based on body size. Count of known CT and cardiac nuc med studies  performed in previous 12 months: 0.     COMPARISON:   None available.    FINDINGS:  Thoracic aorta normal in course and caliber. Heart size normal. No pericardial effusion. There is an obstructing lesion in the mid thoracic esophagus. This may represent a food bolus, but underlying esophageal mass is not excluded on this noncontrast  exam. There is an air-fluid level in the esophagus cephalad to the obstructing lesion. No mediastinal or axillary lymphadenopathy by size criteria.    No pleural effusions. No pneumothorax. No focal pulmonary infiltrates. No suspicious pulmonary nodules or masses.    Visualized upper abdomen is unremarkable. No aggressive osseous lesions.      Impression:         1. Obstructing lesion in the mid thoracic esophagus, which may represent a food bolus. However, underlying esophageal mass is not excluded on this noncontrast exam. Endoscopy may be considered.  2. No mediastinal or axillary lymphadenopathy by size criteria.  3. No other acute chest findings.    Signer Name: Mcihoacano Patel MD   Signed: 12/6/2019 6:14 AM   Workstation Name: [id number]    [facility]    CT Abdomen Pelvis Without Contrast [[id number]] Collected:  12/06/19 0609     Updated:  12/06/19 0611    Narrative:       CT Abdomen Pelvis WO    INDICATION:   64-year-old male with esophageal obstruction after eating a [place] magdaleno yesterday. Dysphagia. History of diverticulitis.    TECHNIQUE:   CT of the abdomen and pelvis without IV contrast. Coronal and sagittal reconstructions were obtained.  Radiation dose reduction techniques included automated exposure control or exposure modulation based on body size. Count of known CT and cardiac nuc  med studies performed in previous 12 months: 0.     COMPARISON:   None available.    FINDINGS:  Visualized lung bases are unremarkable.    Abdomen:   The liver is within normal limits. The spleen and pancreas are normal. Cholecystectomy. Both adrenal glands are normal. Lower pole right renal cyst. Suspected small left renal cysts. 4 mm nonobstructing right intrarenal stone. No obstructing ureteral  calculi or hydronephrosis. Abdominal aorta normal in course and caliber. Small bowel is unremarkable without obstruction. Appendix not clearly visualized and may be surgically absent. Uncomplicated sigmoid colonic diverticulosis. Moderate stool burden.  No free fluid or free air.    Pelvis:   The urinary bladder is unremarkable.  The prostate gland is unremarkable. No free pelvic fluid.    No acute osseous abnormality.        Impression:         1. No acute abdominal or pelvic findings.  2. 4 mm nonobstructing right intrarenal stone.  3. Appendix not clearly visualized and possibly surgically absent. Correlation with operative history.  4. Uncomplicated sigmoid colonic diverticulosis. Moderate stool burden.          Signer Name: Michoacano Patel MD   Signed: 12/6/2019 6:09 AM   Workstation Name: [email or web address]    [facility]    XR Chest 1 View [[id number]] Collected:  12/05/19 2327     Updated:  12/05/19 2329    Narrative:       CR Chest  1 Vw 12/5/2019    INDICATION:   Vomiting and difficulty swallowing for one day.     COMPARISON:    None available.    FINDINGS:  Single portable AP view(s) of the chest.    No visible support lines.    The heart and mediastinal contours are normal. The lungs are clear. No pneumothorax or pleural effusion.       Impression:       No acute cardiopulmonary findings.    Signer Name: Harinder Jacobson MD   Signed: 12/5/2019 11:27 PM   Workstation Name: [id number]    [facility] Current Status    Green Top (No Gel) In process    [place] Draw In process    Tissue Pathology Exam In process        Discharge Details        Discharge Medications      New Medications      Instructions Start Date   acyclovir 400 MG tablet  Commonly known as:  ZOVIRAX   400 mg, Oral, 3 Times Daily, Take no more than 5 doses a day.      pantoprazole 40 MG EC tablet  Commonly known as:  PROTONIX   40 mg, Oral, Daily         Continue These Medications      Instructions Start Date   albuterol sulfate  (90 Base) MCG/ACT inhaler  Commonly known as:  PROVENTIL HFA;VENTOLIN HFA;PROAIR HFA   2 puffs, Inhalation, Every 4 Hours PRN      aspirin 325 MG tablet   325 mg, Oral, 2 Times Daily      carbidopa-levodopa  MG per tablet  Commonly known as:  SINEMET   2 tablets, Oral, 4 Times Daily      clonazePAM 1 MG tablet  Commonly known as:  KlonoPIN   1 mg, Oral, 2 Times Daily PRN      fish oil 1200 MG capsule capsule   Oral, 2 Times Daily With Meals      gabapentin 800 MG tablet  Commonly known as:  NEURONTIN   800 mg, Oral, 2 Times Daily      HYDROcodone-acetaminophen 5-325 MG per tablet  Commonly known as:  NORCO   1 tablet, Oral, Every 6 Hours PRN      metFORMIN 500 MG tablet  Commonly known as:  GLUCOPHAGE   500 mg, Oral, 2 Times Daily With Meals      mometasone 110 MCG/INH inhaler  Commonly known as:  ASMANEX TWISTHALER   2 puffs, Inhalation, Daily - RT      nortriptyline 25 MG  capsule  Commonly known as:  PAMELOR   25 mg, Oral, Nightly      rosuvastatin 5 MG tablet  Commonly known as:  CRESTOR   5 mg, Oral, Daily      vitamin A 24504 UNIT capsule   16,000 Units, Oral, Daily         Stop These Medications    digoxin 125 MCG tablet  Commonly known as:  LANOXIN            Allergies   Allergen Reactions   • Haldol [Haloperidol] Other (See Comments)     DUE TO PARKINSON          Discharge Disposition:  Home or Self Care    Diet:  Hospital:  Diet Order   Procedures   • Diet Soft Texture; Whole Foods; Consistent Carbohydrate, [place]          CODE STATUS:    Code Status and Medical Interventions:   Ordered at: 12/06/19 0208     Code Status:    CPR     Medical Interventions (Level of Support Prior to Arrest):    Full       No future appointments.    Additional Instructions for the Follow-ups that You Need to Schedule     Discharge Follow-up with PCP   As directed       Currently Documented PCP:    Provider, No Known    PCP Phone Number:    None     Follow Up Details:  Within 1 week               Time Spent on Discharge:  33 minutes    Electronically signed by Louisa Keating MD, 12/06/19, 2:24 PM.

## 2019-12-06 NOTE — H&P
"    [facility] Medicine Services  HISTORY AND PHYSICAL    Patient Name: Hernan Caicedo  : 1955  MRN: [id number]  Primary Care Physician: Provider, No Known  Date of admission: 2019      Subjective   Subjective     Chief Complaint:  Difficulty swallowing      HPI:  Hernan Caicedo is a 64 y.o. male w/ a hx of HTN, T2DM, retinitis pigmentosa, Parkinson's and asthma who presented to the ED w/ c/o difficulty swallowing.  Pt reports that he was eating fries this evening (around 7pm) when he became choked and has since been unable to swallow. He has had several episodes of nausea and vomiting; describing that what secretions he does swallow \"come back up\". Since the episode he reports having \"horrific\" heart burn.   Pt denies dyspnea, cough, wheeze, fever/chills, chest pain, diarrhea, abdominal pain, dysuria, edema, syncope, confusion.   Pt evaluated in the ED. XRAY normal. Labs unremarkable. Pt thought to have a possible food impaction. Pt admitted to the hospital medicine service for further evaluation.   **Pt visiting from [place]; his wife has an evaluation at  for a possible liver transplant.       Review of Systems   Constitutional: Negative.    HENT:        Unable to swallow liquid or food; choked w/ food this evening and feels like there is food lodged in esophagus.    Eyes: Negative.    Respiratory: Negative.    Cardiovascular: Negative.  Negative for chest pain.   Gastrointestinal: Positive for nausea and vomiting. Negative for abdominal distention, abdominal pain, anal bleeding, blood in stool, constipation, diarrhea and rectal pain.        Severe reflux    Endocrine: Negative.    Genitourinary: Negative.    Musculoskeletal: Negative.    Skin: Negative.    Allergic/Immunologic: Negative.    Neurological: Negative.    Hematological: Negative.    Psychiatric/Behavioral: Negative.    All other systems reviewed and are negative.     All other systems reviewed and are " negative.     Personal History     Past Medical History:   Diagnosis Date   • Asthma    • Diabetes mellitus (CMS/HCC)    • GERD (gastroesophageal reflux disease)    • Hx of vasectomy    • Hypertension    • Legally blind    • Orthostatic hypotension    • Parkinson disease (CMS/HCC)    • Parkinson disease (CMS/HCC)    • Parkinson disease (CMS/HCC)    • PONV (postoperative nausea and vomiting)    • Retinitis pigmentosa        Past Surgical History:   Procedure Laterality Date   • APPENDECTOMY     • CATARACT EXTRACTION, BILATERAL  2000   • CHOLECYSTECTOMY     • ENDOSCOPY     • TONSILLECTOMY         Family History: family history includes Cancer in his mother; Neuropathy in his father. Otherwise pertinent FHx was reviewed and unremarkable.     Social History:  reports that he has never smoked. He has never used smokeless tobacco. He reports that he does not drink alcohol or use drugs.  Social History     Social History Narrative    Lives in [place].        Medications:  Available home medication information reviewed.  Medications Prior to Admission   Medication Sig Dispense Refill Last Dose   • albuterol sulfate  (90 Base) MCG/ACT inhaler Inhale 2 puffs Every 4 (Four) Hours As Needed for Wheezing.   Past Week at Unknown time   • aspirin 325 MG tablet Take 325 mg by mouth 2 (Two) Times a Day.   12/6/2019 at Unknown time   • carbidopa-levodopa (SINEMET)  MG per tablet Take 2 tablets by mouth 4 (Four) Times a Day.   12/6/2019 at 1200   • clonazePAM (KlonoPIN) 1 MG tablet Take 1 mg by mouth 2 (Two) Times a Day As Needed for Seizures.   12/5/2019 at Unknown time   • digoxin (LANOXIN) 125 MCG tablet Take 125 mcg by mouth Daily.   12/6/2019 at Unknown time   • gabapentin (NEURONTIN) 800 MG tablet Take 800 mg by mouth 2 (Two) Times a Day.   12/6/2019 at 0900   • HYDROcodone-acetaminophen (NORCO) 5-325 MG per tablet Take 1 tablet by mouth Every 6 (Six) Hours As Needed.   Past Week at Unknown time   • metFORMIN  (GLUCOPHAGE) 500 MG tablet Take 500 mg by mouth 2 (Two) Times a Day With Meals.   12/6/2019 at Unknown time   • mometasone (ASMANEX TWISTHALER) inhaler 110 mcg/inhalation Inhale 2 puffs Daily.   12/6/2019 at Unknown time   • nortriptyline (PAMELOR) 25 MG capsule Take 25 mg by mouth Every Night.   12/5/2019 at Unknown time   • Omega-3 Fatty Acids (FISH OIL) 1200 MG capsule capsule Take  by mouth 2 (Two) Times a Day With Meals.   12/6/2019 at Unknown time   • rosuvastatin (CRESTOR) 5 MG tablet Take 5 mg by mouth Daily.   12/6/2019 at Unknown time   • vitamin A 49220 UNIT capsule Take 16,000 Units by mouth Daily.   12/6/2019 at Unknown time       Allergies   Allergen Reactions   • Haldol [Haloperidol] Other (See Comments)     DUE TO PARKINSON        Objective   Objective     Vital Signs:   Temp:  [98.6 °F (37 °C)] 98.6 °F (37 °C)  Heart Rate:  [80-96] 94  Resp:  [12] 12  BP: (133-189)/() 133/105        Physical Exam   Constitutional: He is oriented to person, place, and time. He appears well-developed and well-nourished. No distress.   HENT:   Head: Normocephalic and atraumatic.   Mouth/Throat: Oropharynx is clear and moist.   Unable to swallow secretions; has suction in hand. No visible obstruction seen.    Eyes: EOM are normal. Pupils are equal, round, and reactive to light.   Neck: Normal range of motion. Neck supple.   Cardiovascular: Normal rate, regular rhythm, normal heart sounds and intact distal pulses. Exam reveals no gallop and no friction rub.   No murmur heard.  Pulmonary/Chest: Effort normal and breath sounds normal. No stridor. No respiratory distress. He has no wheezes. He has no rales. He exhibits no tenderness.   Abdominal: Soft. Bowel sounds are normal. He exhibits no distension. There is no tenderness. There is no guarding.   Musculoskeletal: Normal range of motion. He exhibits no edema, tenderness or deformity.   Neurological: He is alert and oriented to person, place, and time. No cranial  nerve deficit.   Skin: Skin is warm and dry. Capillary refill takes 2 to 3 seconds. No rash noted. He is not diaphoretic. No erythema. No pallor.   Psychiatric: He has a normal mood and affect. His behavior is normal. Judgment and thought content normal.   Nursing note and vitals reviewed.       Results Reviewed:  I have personally reviewed current lab and radiology data.    Results from last 7 days   Lab Units 12/05/19  2143   WBC 10*3/mm3 8.59   HEMOGLOBIN g/dL 13.7   HEMATOCRIT % 42.4   PLATELETS 10*3/mm3 229     Results from last 7 days   Lab Units 12/05/19  2143   SODIUM mmol/L 142   POTASSIUM mmol/L 4.0   CHLORIDE mmol/L 102   CO2 mmol/L 24.0   BUN mg/dL 17   CREATININE mg/dL 1.09   GLUCOSE mg/dL 192*   CALCIUM mg/dL 9.6     Estimated Creatinine Clearance: 82.3 mL/min (by C-G formula based on SCr of 1.09 mg/dL).  Brief Urine Lab Results     None        Imaging Results (Last 24 Hours)     Procedure Component Value Units Date/Time    XR Chest 1 View [[id number]] Collected:  12/05/19 2327     Updated:  12/05/19 2329    Narrative:       CR Chest 1 Vw 12/5/2019    INDICATION:   Vomiting and difficulty swallowing for one day.     COMPARISON:    None available.    FINDINGS:  Single portable AP view(s) of the chest.    No visible support lines.    The heart and mediastinal contours are normal. The lungs are clear. No pneumothorax or pleural effusion.       Impression:       No acute cardiopulmonary findings.    Signer Name: Harinder Jacobson MD   Signed: 12/5/2019 11:27 PM   Workstation Name: [id number]    [facility]             Assessment/Plan   Assessment / Plan     Active Hospital Problems    Diagnosis POA   • **Food impaction of esophagus [T18.128A] Yes   • T2DM (type 2 diabetes mellitus) (CMS/HCC) [E11.9] Yes   • GERD (gastroesophageal reflux disease) [K21.9] Yes   • HTN (hypertension) [I10] Yes   • Parkinson disease (CMS/HCC) [G20] Yes   • Retinitis pigmentosa [H35.52] Yes   • Legally blind  [H54.8] Yes   • Asthma [J45.909] Yes   • Vaso vagal episode [R55] Unknown       Assessment & Plan    **Possible esophageal food impaction  -CXR negative  -NPO  -s/p 500ml NS bolus; continue NS @ 75ml/hour x 1 liter  -pt c/o reflux; IV Pepcid and Protonix ordered  -continuous pulse oximetry and telemetry  -bmp, coags, type/screen and pre-procedure EKG ordered for this am  -Dr. Bajwa w/ GI consulted to see patient this am     **T2DM  -holding routine meds- metformin   -FSBG q ac/hs w/ LDSS  -hypoglycemia standing orders in place     **Parkinson's   -routine oral meds held for now- sinemet     **Asthma  -stable  -albuterol PRN       DVT prophylaxis:  Mechanical     CODE STATUS:    Code Status and Medical Interventions:   Ordered at: 12/06/19 0208     Code Status:    CPR     Medical Interventions (Level of Support Prior to Arrest):    Full       Admission Status:  I believe this patient meets INPATIENT status due to esophageal obstruction and vasovagal episodes.  I feel patient’s risk for adverse outcomes and need for care warrant INPATIENT evaluation and I predict the patient’s care encounter to likely last beyond 2 midnights.        Electronically signed by NADYA Hackett, 12/06/19, 1:34 AM.          Attending   Admission Attestation       I have seen and examined the patient, performing an independent face-to-face diagnostic evaluation with plan of care reviewed and developed with the advanced practice clinician (APC).      Brief Summary Statement:   Hernan Caicedo is a 64 y.o. male with PD, retinitis pigmentosa, asthma, hypertension  And Orthostatic hypotension.  Patient was driving to [employer] today to bring his wife to "[employer] to discuss possible liver transplant.  He is from [place].  He and his wife stopped to eat at [employer] and patient states that while eating French fries around 7 PM he became choked and was having difficulty swallowing.  He also developed nausea and vomiting.  States he was  unable to swallow his secretions.  He denied any coughing or wheezing or shortness of breath fever chills or chest pain.  On arrival to the floor patient was noted to have 2 and 3-second pauses on telemetry.  He was also noted to have a heart rate at times in the 30s and at times a heart rate of 150s.  Called and discussed this with cardiology on-call Dr. Haddad how advised that this was a vagal response and could be monitored for now. We will continue to monitor closely.       Remainder of detailed HPI is as noted by APC and has been reviewed and/or edited by me for completeness.    Attending Physical Exam:  Constitutional: No acute distress, awake, alert, in pain   HENT: NCAT, mucous membranes moist, using yankauer suction   Respiratory: Clear to auscultation bilaterally, respiratory effort normal   Cardiovascular: RRR, no murmurs, rubs, or gallops, palpable pedal pulses bilaterally  Gastrointestinal: Positive bowel sounds, soft, mild tenderness, nondistended  Musculoskeletal: No bilateral ankle edema  Psychiatric: Appropriate affect, cooperative  Neurologic: Oriented x 3, strength symmetric in all extremities, Cranial Nerves grossly intact to confrontation, speech clear  Skin: No rashes      Brief Assessment/Plan :  See detailed assessment and plan developed with APC which I have reviewed and/or edited for completeness.    Electronically signed by Sherley Beebe DO, 12/06/19, 2:33 AM.

## 2019-12-06 NOTE — ED PROVIDER NOTES
"Subjective   64-year-old male presents for evaluation of \"difficulty swallowing.\"  Of note, the patient is currently visiting [place] with his wife from [place].  He states that over the past week or so, he has been experiencing intermittent episodes of dysphasia, especially when he is attempting to swallow solids.  However, tonight, approximately 1 to 2 hours prior to coming to the emergency department, the patient was eating French fries at [facility] when he states that he \"got choked up.\"  Since that time, he has been unable to tolerate oral solids or liquids and is having vomiting and trouble tolerating his secretions as well.  No similar episodes before in the past prior to 1 week ago.  No fevers, chills, night sweats, or other constitutional symptoms.  He subsequently came to the ED for evaluation.        History provided by:  Patient and relative  Difficulty Swallowing   Location:  Difficulty swallowing  Severity:  Moderate  Onset quality:  Sudden  Duration:  1 week  Timing:  Constant  Progression:  Worsening  Chronicity:  New  Context:  Patient has had trouble swallowing for the past week, gradually worsening. Today he states he choked on some fries.  Relieved by:  None tried  Worsened by:  Nothing  Ineffective treatments:  None tried  Associated symptoms: nausea and vomiting        Review of Systems   HENT: Positive for trouble swallowing.    Gastrointestinal: Positive for nausea and vomiting.   All other systems reviewed and are negative.      No past medical history on file.    Allergies   Allergen Reactions   • Haldol [Haloperidol] Other (See Comments)     DUE TO PARKINSON        No past surgical history on file.    No family history on file.    Social History     Socioeconomic History   • Marital status:      Spouse name: Not on file   • Number of children: Not on file   • Years of education: Not on file   • Highest education level: Not on file         Objective   Physical Exam " "  Constitutional: He is oriented to person, place, and time. He appears well-developed and well-nourished. No distress.   Nontoxic-appearing male   HENT:   Head: Normocephalic and atraumatic.   Mouth/Throat: Oropharynx is clear and moist.   Oropharynx clear, normal voice, no trismus, uvula midline   Neck: Normal range of motion. No JVD present.   Cardiovascular: Normal rate, regular rhythm and normal heart sounds. Exam reveals no gallop and no friction rub.   No murmur heard.  Pulmonary/Chest: Effort normal and breath sounds normal. No respiratory distress. He has no wheezes. He has no rales.   Abdominal: Soft. Bowel sounds are normal. He exhibits no distension and no mass. There is no tenderness. There is no guarding.   Musculoskeletal: Normal range of motion.   Neurological: He is alert and oriented to person, place, and time.   Skin: Skin is warm and dry. No rash noted. He is not diaphoretic. No erythema. No pallor.   Psychiatric: He has a normal mood and affect. Judgment and thought content normal.   Nursing note and vitals reviewed.      Procedures         ED Course  ED Course as of Dec 06 0255   Thu Dec 05, 2019   2223 Paged GI.  -DMD  [NP]   2225 Discussed the case with Dr. Bajwa, gastroenterologist. -DMD  [NP]   2310 At bedside re-evaluating the patient and updating him and his family on labs/imaging, as well as, plan to admit. -DMD  [NP]   6348 Discussed the case with Dr. Beebe, hospitalist, who agrees to admit. -DMD  [NP]   Fri Dec 06, 2019   0253 64-year-old male presents for evaluation of \"difficulty swallowing.\"  He states that he has had trouble swallowing liquids now intermittently for the past week but his symptoms worsened acutely tonight while eating French fries at [employer] just prior to coming to the emergency department.  On arrival, patient nontoxic-appearing but with active vomiting and appears to have difficulty tolerating his secretions.  Glucagon attempted without any significant " "relief.  Zofran administered.  Upon reevaluation, patient felt a bit improved but symptoms are persistent.  I discussed his case with Dr. Bajwa, and as the patient is currently stable and has no airway issues, he preferred to wait until first thing in the morning to take the patient for endoscopy.  I feel that this is a reasonable plan and the patient is in agreement as well.  I discussed the patient's case with Dr. Beebe, and he will be admitted under her care for further evaluation and treatment.  He is hemodynamically stable at this time and aware/agreeable with the plan.  [DD]      ED Course User Index  [DD] Daniel Armendariz MD  [NP] Bhumika Lewis     No results found for this or any previous visit (from the past 24 hour(s)).  Note: In addition to lab results from this visit, the labs listed above may include labs taken at another facility or during a different encounter within the last 24 hours. Please correlate lab times with ED admission and discharge times for further clarification of the services performed during this visit.    No orders to display     Vitals:    12/05/19 2130 12/05/19 2131 12/05/19 2138   BP: (!) 189/91  (!) 184/101   BP Location:   Left arm   Patient Position:   Lying   Pulse: 80  86   Resp: 12  12   Temp: 98.6 °F (37 °C)     SpO2: 99%  97%   Weight:  83.9 kg (185 lb) 83.9 kg (185 lb)   Height:  180.3 cm (71\") 180.3 cm (71\")     Medications - No data to display  ECG/EMG Results (last 24 hours)     ** No results found for the last 24 hours. **        No orders to display                 Recent Results (from the past 24 hour(s))   Light Blue Top    Collection Time: 12/05/19  9:43 PM   Result Value Ref Range    Extra Tube hold for add-on    Green Top (Gel)    Collection Time: 12/05/19  9:43 PM   Result Value Ref Range    Extra Tube Hold for add-ons.    Lavender Top    Collection Time: 12/05/19  9:43 PM   Result Value Ref Range    Extra Tube hold for add-on    Gold Top - SST    " Collection Time: 12/05/19  9:43 PM   Result Value Ref Range    Extra Tube Hold for add-ons.    Basic Metabolic Panel    Collection Time: 12/05/19  9:43 PM   Result Value Ref Range    Glucose 192 (H) 65 - 99 mg/dL    BUN 17 8 - 23 mg/dL    Creatinine 1.09 0.76 - 1.27 mg/dL    Sodium 142 136 - 145 mmol/L    Potassium 4.0 3.5 - 5.2 mmol/L    Chloride 102 98 - 107 mmol/L    CO2 24.0 22.0 - 29.0 mmol/L    Calcium 9.6 8.6 - 10.5 mg/dL    eGFR Non African Amer 68 >60 mL/min/1.73    BUN/Creatinine Ratio 15.6 7.0 - 25.0    Anion Gap 16.0 (H) 5.0 - 15.0 mmol/L   CBC Auto Differential    Collection Time: 12/05/19  9:43 PM   Result Value Ref Range    WBC 8.59 3.40 - 10.80 10*3/mm3    RBC 4.42 4.14 - 5.80 10*6/mm3    Hemoglobin 13.7 13.0 - 17.7 g/dL    Hematocrit 42.4 37.5 - 51.0 %    MCV 95.9 79.0 - 97.0 fL    MCH 31.0 26.6 - 33.0 pg    MCHC 32.3 31.5 - 35.7 g/dL    RDW 13.0 12.3 - 15.4 %    RDW-SD 45.9 37.0 - 54.0 fl    MPV 11.1 6.0 - 12.0 fL    Platelets 229 140 - 450 10*3/mm3    Neutrophil % 50.0 42.7 - 76.0 %    Lymphocyte % 38.1 19.6 - 45.3 %    Monocyte % 7.6 5.0 - 12.0 %    Eosinophil % 3.4 0.3 - 6.2 %    Basophil % 0.6 0.0 - 1.5 %    Immature Grans % 0.3 0.0 - 0.5 %    Neutrophils, Absolute 4.30 1.70 - 7.00 10*3/mm3    Lymphocytes, Absolute 3.27 (H) 0.70 - 3.10 10*3/mm3    Monocytes, Absolute 0.65 0.10 - 0.90 10*3/mm3    Eosinophils, Absolute 0.29 0.00 - 0.40 10*3/mm3    Basophils, Absolute 0.05 0.00 - 0.20 10*3/mm3    Immature Grans, Absolute 0.03 0.00 - 0.05 10*3/mm3    nRBC 0.0 0.0 - 0.2 /100 WBC   Lipase    Collection Time: 12/05/19  9:43 PM   Result Value Ref Range    Lipase 41 13 - 60 U/L     Note: In addition to lab results from this visit, the labs listed above may include labs taken at another facility or during a different encounter within the last 24 hours. Please correlate lab times with ED admission and discharge times for further clarification of the services performed during this visit.    XR Chest 1 View    Final Result   No acute cardiopulmonary findings.      Signer Name: Harinder Jacobson MD    Signed: 12/5/2019 11:27 PM    Workstation Name: [id number]     [facility]        Vitals:    12/05/19 2200 12/05/19 2230 12/05/19 2300 12/05/19 2301   BP: 142/95 (!) 149/105 (!) 133/105    BP Location:       Patient Position:       Pulse: 96 90  94   Resp:       Temp:       SpO2: 96% 95% 96% 96%   Weight:       Height:         Medications   sterile water (preservative free) injection  - ADS Override Pull (not administered)   famotidine (PEPCID) injection 20 mg (not administered)   pantoprazole (PROTONIX) injection 40 mg (not administered)   ! Home medications stored in Pharmacy, please contact prior to disharge (not administered)   dextrose (GLUTOSE) oral gel 15 g (not administered)   dextrose (D50W) 25 g/ 50mL Intravenous Solution 25 g (not administered)   glucagon (human recombinant) (GLUCAGEN DIAGNOSTIC) injection 1 mg (not administered)   sodium chloride 0.9 % flush 10 mL (not administered)   sodium chloride 0.9 % flush 10 mL (not administered)   sodium chloride 0.9 % infusion (not administered)   insulin lispro (humaLOG) injection 0-7 Units (not administered)   ondansetron (ZOFRAN) tablet 4 mg (not administered)     Or   ondansetron (ZOFRAN) injection 4 mg (not administered)   ondansetron (ZOFRAN) injection 4 mg (not administered)   lactated ringers infusion (not administered)   albuterol (PROVENTIL) nebulizer solution 0.083% 2.5 mg/3mL (not administered)   LORazepam (ATIVAN) injection 0.5 mg (not administered)   sodium chloride 0.9 % bolus 500 mL (500 mL Intravenous New Bag 12/5/19 2350)   ondansetron (ZOFRAN) injection 4 mg (4 mg Intravenous Given 12/5/19 2310)   glucagon (human recombinant) (GLUCAGEN DIAGNOSTIC) injection 1 mg (1 mg Intravenous Given 12/5/19 2312)     ECG/EMG Results (last 24 hours)     Procedure Component Value Units Date/Time    ECG 12 Lead [[id number]] Collected:  12/06/19 1745      Updated:  12/06/19 0246    Narrative:       Test Reason : bradycardia  Blood Pressure : **/** mmHG  Vent. Rate : 104 BPM     Atrial Rate : 104 BPM     P-R Int : 120 ms          QRS Dur : 102 ms      QT Int : 376 ms       P-R-T Axes : 000 -17 -19 degrees     QTc Int : 494 ms    Sinus tachycardia  Incomplete right bundle branch block  Nonspecific ST and T wave abnormality  Abnormal ECG  No previous ECGs available    Referred By: MD KINNEY           Confirmed By:         ECG 12 Lead   Preliminary Result   Test Reason : bradycardia   Blood Pressure : **/** mmHG   Vent. Rate : 104 BPM     Atrial Rate : 104 BPM      P-R Int : 120 ms          QRS Dur : 102 ms       QT Int : 376 ms       P-R-T Axes : 000 -17 -19 degrees      QTc Int : 494 ms      Sinus tachycardia   Incomplete right bundle branch block   Nonspecific ST and T wave abnormality   Abnormal ECG   No previous ECGs available      Referred By: MD KINNEY           Confirmed By:       ECG 12 Lead    (Results Pending)             MDM    Final diagnoses:   Dysphagia, unspecified type       Documentation assistance provided by scrleigha Lewis.  Information recorded by the scribe was done at my direction and has been verified and validated by me.     Bhumika Lewis  12/05/19 2226       Daniel Armendariz MD  12/06/19 7357

## 2019-12-06 NOTE — POST-PROCEDURE NOTE
EGD  No foreign body seen.  LA  Grade C with ulceration noted 26-30 cm.  [place]'s from 30 -40 CM.  No HH seen.  Stomach erosive gastritis      REC FU Bx  Cont PPI and begin acyclovir pending bx

## 2019-12-06 NOTE — ANESTHESIA PREPROCEDURE EVALUATION
Anesthesia Evaluation     Patient summary reviewed and Nursing notes reviewed   history of anesthetic complications: PONV  NPO Solid Status: > 8 hours  NPO Liquid Status: > 8 hours           Airway   Mallampati: II  TM distance: >3 FB  Neck ROM: full  No difficulty expected  Dental      Pulmonary    (+) asthma (Daily Rx prn rescue MDI ),  (-) COPD, shortness of breath, recent URI, sleep apnea, not a smoker  Cardiovascular     ECG reviewed    (+) hypertension,   (-) past MI, dysrhythmias, cardiac stents    ROS comment: Sinus tachycardia  Incomplete right bundle branch block  Nonspecific ST and T wave abnormality    External pacer   Cleared by Dr Laird   Dig level pending     Neuro/Psych  (+) syncope, psychiatric history (klonopin ),     (-) seizures, CVA    ROS Comment: Legally blind   Parkinsons  VasoVagal   GI/Hepatic/Renal/Endo    (+)  GERD,  diabetes mellitus,   (-) liver disease, no renal disease    Musculoskeletal     Abdominal    Substance History      OB/GYN          Other        ROS/Med Hx Other: Digoxin - Cardiology does not know why                Anesthesia Plan    ASA 3     general   (RSI CP ETT   External pacer if needed)  intravenous induction     Anesthetic plan, all risks, benefits, and alternatives have been provided, discussed and informed consent has been obtained with: patient.    Plan discussed with CRNA.

## 2019-12-06 NOTE — CONSULTS
[facility]  Cardiovascular Consultation Note    Reason for consultation: Bradycardia    History of Present Illness:  This 64-year-old male with diabetes hypertension Parkinson's legal blindness came in because he had a food impaction and we are asked to see him because he having bradycardia during the early morning hours.  The patient denies any history of tightness having squeezing pressure chest jaw throat arms.  He denies dyspnea or changes in the breathing.  His energy level waxes and wanes which he blames on his Parkinson's disease.  He saw cardiologist 7 years ago when he was evaluated for orthostatic hypotension.  He had a stress test which was negative and echo which was normal.  He is on digoxin for unknown reasons.  He states he was given that for his orthostatic hypotension.  He denies any history of palpitations or tachyarrhythmias.  He denies episodes of syncope near syncope.  The patient states he had severe heartburn and pain last evening and this was associated with the bradycardia.  The bradycardia is not sustained    Cardiac risk factors: Diabetes #2 hypertension #3 male sex #4 increase age    Past medical and surgical history, social and family history reviewed in EMR.    REVIEW OF SYSTEMS:     Past Medical History:   Diagnosis Date   • Asthma    • Diabetes mellitus (CMS/HCC)    • GERD (gastroesophageal reflux disease)    • Hx of vasectomy    • Hypertension    • Legally blind    • Orthostatic hypotension    • Parkinson disease (CMS/HCC)    • Parkinson disease (CMS/HCC)    • Parkinson disease (CMS/HCC)    • PONV (postoperative nausea and vomiting)    • Retinitis pigmentosa      Past Surgical History:   Procedure Laterality Date   • APPENDECTOMY     • CATARACT EXTRACTION, BILATERAL  2000   • CHOLECYSTECTOMY     • ENDOSCOPY     • TONSILLECTOMY       Social History     Socioeconomic History   • Marital status:      Spouse name: Not on file   • Number of  children: Not on file   • Years of education: Not on file   • Highest education level: Not on file   Tobacco Use   • Smoking status: Never Smoker   • Smokeless tobacco: Never Used   Substance and Sexual Activity   • Alcohol use: No     Frequency: Never   • Drug use: No   • Sexual activity: Defer   Social History Narrative    Lives in [place].      Family History   Problem Relation Age of Onset   • Cancer Mother    • Neuropathy Father        H&P ROS reviewed and pertinent CV ROS as noted in HPI.    Cardiac: History of prior negative cardiac work-up.  No history of arrhythmias.  No tightness having squeezing pressure chest jaw throat arms no shortness of breath.  He is on digoxin he says for orthostatic hypotension  Respiratory: Has a history of asthma which is well-controlled.  Denies any wheezing shortness of breath hemoptysis  Lower Extremities: No lesions no radicular back pain  GI: Complains of severe heartburn complains of inability to swallow no diarrhea or blood  Neuro: No history of stroke TIA neurologic event.  No radicular pain.  Does have orthostatic hypotension  Hematology: No bleeding diathesis ecchymosis or petechia  Musculoskeletal-he has no joint complaints      Physical Exam: General well-developed well-nourished male in bed at a 75 degrees with a heart rate of 80 normal blood pressure not dyspneic not tachypneic       HEENT: No JVP, no bruits, tongue midline, dentition good, no icterus       Respiratory: Equal bilateral symmetrical expansion clear to auscultation bilaterally       Cardiovascular: Regular rate and rhythm without murmur gallop or click and no edema with 2+ PT pulses bilaterally       GI: Positive bowel sounds       Lower Extremities: No lesions       Neuro: Patient expressions are symmetrical he is able to move all 4 extremities and set up in the bed under his own strength       Skin: Warm and dry with no edema to palpation       Psych: Pleasant affect and oriented x3    Results  Review: I reviewed telemetry which shows episodes of isorhythmic A-V dissociation short pauses but most of these occurred during sleep hours.  These bradycardia arrhythmias are not sustained           Vital Sign Min/Max for last 24 hours  Temp  Min: 97.9 °F (36.6 °C)  Max: 98.6 °F (37 °C)   BP  Min: 133/105  Max: 189/91   Pulse  Min: 52  Max: 96   Resp  Min: 12  Max: 20   SpO2  Min: 93 %  Max: 99 %   No Data Recorded      Intake/Output Summary (Last 24 hours) at 12/6/2019 1000  Last data filed at 12/6/2019 0400  Gross per 24 hour   Intake --   Output 300 ml   Net -300 ml             Current Facility-Administered Medications:   •  ! Home medications stored in Pharmacy, please contact prior to disharge, , Does not apply, Q12H, Adam Hernandez [facility]  •  albuterol (PROVENTIL) nebulizer solution 0.083% 2.5 mg/3mL, 2.5 mg, Nebulization, Q6H PRN, Kerry Cronin APRN  •  dextrose (D50W) 25 g/ 50mL Intravenous Solution 25 g, 25 g, Intravenous, Q15 Min PRN, Kerry Cronin APRN  •  dextrose (GLUTOSE) oral gel 15 g, 15 g, Oral, Q15 Min PRN, Kerry Cronin APRN  •  famotidine (PEPCID) injection 20 mg, 20 mg, Intravenous, Q12H, Kerry Cronin APRN, 20 mg at 12/06/19 0834  •  glucagon (human recombinant) (GLUCAGEN DIAGNOSTIC) injection 1 mg, 1 mg, Subcutaneous, Q15 Min PRN, Kerry Cronin APRN  •  insulin lispro (humaLOG) injection 0-7 Units, 0-7 Units, Subcutaneous, 4x Daily With Meals & Nightly, Kerry Cronin APRN  •  lactated ringers infusion, 100 mL/hr, Intravenous, Continuous, Sherley Beebe DO, Last Rate: 100 mL/hr at 12/06/19 0312, 100 mL/hr at 12/06/19 0312  •  morphine injection 2 mg, 2 mg, Intravenous, Q3H PRN, Sherley Beebe DO  •  ondansetron (ZOFRAN) tablet 4 mg, 4 mg, Oral, Q6H PRN **OR** ondansetron (ZOFRAN) injection 4 mg, 4 mg, Intravenous, Q6H PRN, Kerry Cronin APRN  •  ondansetron (ZOFRAN) injection 4 mg, 4 mg, Intravenous, Q6H PRN, Sherley Beebe DO  •  pantoprazole (PROTONIX) injection 40 mg, 40 mg,  Intravenous, Q12H, Kerry Cronin, APRN, 40 mg at 12/06/19 0834  •  sodium chloride 0.9 % flush 10 mL, 10 mL, Intravenous, Q12H, Kerry Cronin, APRN, 10 mL at 12/06/19 0834  •  sodium chloride 0.9 % flush 10 mL, 10 mL, Intravenous, PRN, Linwood Cronina, APRN  •  sodium chloride 0.9 % infusion, 75 mL/hr, Intravenous, Once, Kerry Cronin, APRN  •  sterile water (preservative free) injection  - ADS Override Pull, , , ,     Lab Review:   Results from last 7 days   Lab Units 12/05/19  2143   WBC 10*3/mm3 8.59   HEMOGLOBIN g/dL 13.7   PLATELETS 10*3/mm3 229     Results from last 7 days   Lab Units 12/06/19  0336 12/05/19  2143   SODIUM mmol/L 140 142   POTASSIUM mmol/L 4.5 4.0   CO2 mmol/L 25.0 24.0   BUN mg/dL 16 17   CREATININE mg/dL 1.04 1.09   GLUCOSE mg/dL 149* 192*     Estimated Creatinine Clearance: 86.3 mL/min (by C-G formula based on SCr of 1.04 mg/dL).        .lipd  Lab Results   Component Value Date    AST 13 12/06/2019    ALT 20 12/06/2019       Radiology Reports:  Imaging Results (Last 72 Hours)     Procedure Component Value Units Date/Time    CT Chest Without Contrast [[id number]] Collected:  12/06/19 0614     Updated:  12/06/19 0616    Narrative:       CT Chest WO    INDICATION:   64-year-old male with dysphasia and esophageal obstruction after eating a Frisian magdaleno yesterday.    TECHNIQUE:   CT of the thorax without IV contrast. Coronal and sagittal reconstructions were obtained.  Radiation dose reduction techniques included automated exposure control or exposure modulation based on body size. Count of known CT and cardiac nuc med studies  performed in previous 12 months: 0.     COMPARISON:   None available.    FINDINGS:  Thoracic aorta normal in course and caliber. Heart size normal. No pericardial effusion. There is an obstructing lesion in the mid thoracic esophagus. This may represent a food bolus, but underlying esophageal mass is not excluded on this noncontrast  exam. There is an air-fluid level in  the esophagus cephalad to the obstructing lesion. No mediastinal or axillary lymphadenopathy by size criteria.    No pleural effusions. No pneumothorax. No focal pulmonary infiltrates. No suspicious pulmonary nodules or masses.    Visualized upper abdomen is unremarkable. No aggressive osseous lesions.      Impression:         1. Obstructing lesion in the mid thoracic esophagus, which may represent a food bolus. However, underlying esophageal mass is not excluded on this noncontrast exam. Endoscopy may be considered.  2. No mediastinal or axillary lymphadenopathy by size criteria.  3. No other acute chest findings.    Signer Name: Michoacano Patel MD   Signed: 12/6/2019 6:14 AM   Workstation Name: [id number]    [facility]    CT Abdomen Pelvis Without Contrast [[id number]] Collected:  12/06/19 0609     Updated:  12/06/19 0611    Narrative:       CT Abdomen Pelvis WO    INDICATION:   64-year-old male with esophageal obstruction after eating a Greek magdaleno yesterday. Dysphagia. History of diverticulitis.    TECHNIQUE:   CT of the abdomen and pelvis without IV contrast. Coronal and sagittal reconstructions were obtained.  Radiation dose reduction techniques included automated exposure control or exposure modulation based on body size. Count of known CT and cardiac nuc  med studies performed in previous 12 months: 0.     COMPARISON:   None available.    FINDINGS:  Visualized lung bases are unremarkable.    Abdomen:   The liver is within normal limits. The spleen and pancreas are normal. Cholecystectomy. Both adrenal glands are normal. Lower pole right renal cyst. Suspected small left renal cysts. 4 mm nonobstructing right intrarenal stone. No obstructing ureteral  calculi or hydronephrosis. Abdominal aorta normal in course and caliber. Small bowel is unremarkable without obstruction. Appendix not clearly visualized and may be surgically absent. Uncomplicated sigmoid colonic diverticulosis. Moderate  stool burden.  No free fluid or free air.    Pelvis:   The urinary bladder is unremarkable.  The prostate gland is unremarkable. No free pelvic fluid.    No acute osseous abnormality.        Impression:         1. No acute abdominal or pelvic findings.  2. 4 mm nonobstructing right intrarenal stone.  3. Appendix not clearly visualized and possibly surgically absent. Correlation with operative history.  4. Uncomplicated sigmoid colonic diverticulosis. Moderate stool burden.          Signer Name: Michoacano Patel MD   Signed: 12/6/2019 6:09 AM   Workstation Name: [id number]    [facility]    XR Chest 1 View [[id number]] Collected:  12/05/19 2327     Updated:  12/05/19 2329    Narrative:       CR Chest 1 Vw 12/5/2019    INDICATION:   Vomiting and difficulty swallowing for one day.     COMPARISON:    None available.    FINDINGS:  Single portable AP view(s) of the chest.    No visible support lines.    The heart and mediastinal contours are normal. The lungs are clear. No pneumothorax or pleural effusion.       Impression:       No acute cardiopulmonary findings.    Signer Name: Harinder Jacobson MD   Signed: 12/5/2019 11:27 PM   Workstation Name: [email or web address]    [facility]          Assessment/Plan: 1 episodes of nonsustained bradycardia.  The patient's been having these throughout the night but he is also been complaining of severe heartburn and pain.  This very well could be vagal induced from his obstruction.  He has no history of ischemic heart disease symptoms, he has no history of tachyarrhythmias, no history of near syncope or presyncope.  He is however on digoxin for unknown reasons.  He states he is on if orthostatic hypotension but to the best of my knowledge there is no indication for that.  I will check a dig level because even therapeutic levels can cause some bradycardia.  The patient is cleared to have his EGD.  Can leave him on the external pacer till after the  procedure is over but there is no reason to continue that.      Fabricio Casas MD  12/06/19  10:00 AM

## 2019-12-08 LAB
CYTO UR: NORMAL
LAB AP CASE REPORT: NORMAL
LAB AP CLINICAL INFORMATION: NORMAL
PATH REPORT.FINAL DX SPEC: NORMAL
PATH REPORT.GROSS SPEC: NORMAL

## (undated) DEVICE — SYR LUERLOK 50ML

## (undated) DEVICE — INTRO ACCSR BLNT TP

## (undated) DEVICE — TUBING, SUCTION, 1/4" X 10', STRAIGHT: Brand: MEDLINE

## (undated) DEVICE — KT BIOGUARD SXN VLV AIR/H20 4PC DISP

## (undated) DEVICE — CONTN GRAD MEAS TRIANG 32OZ BLK

## (undated) DEVICE — THE BITE BLOCK MAXI, LATEX FREE STRAP IS USED TO PROTECT THE ENDOSCOPE INSERTION TUBE FROM BEING BITTEN BY THE PATIENT.

## (undated) DEVICE — SPNG VERSALON 4X4 4PLY NONSTRL LF BG/200

## (undated) DEVICE — MSK ENDO PORT O2 POM ELITE CURAPLEX A/